# Patient Record
Sex: MALE | Race: WHITE
[De-identification: names, ages, dates, MRNs, and addresses within clinical notes are randomized per-mention and may not be internally consistent; named-entity substitution may affect disease eponyms.]

---

## 2016-09-09 VITALS
DIASTOLIC BLOOD PRESSURE: 97 MMHG | SYSTOLIC BLOOD PRESSURE: 146 MMHG | SYSTOLIC BLOOD PRESSURE: 146 MMHG | DIASTOLIC BLOOD PRESSURE: 97 MMHG | DIASTOLIC BLOOD PRESSURE: 97 MMHG | SYSTOLIC BLOOD PRESSURE: 146 MMHG | SYSTOLIC BLOOD PRESSURE: 146 MMHG | DIASTOLIC BLOOD PRESSURE: 97 MMHG | DIASTOLIC BLOOD PRESSURE: 97 MMHG | SYSTOLIC BLOOD PRESSURE: 146 MMHG | SYSTOLIC BLOOD PRESSURE: 146 MMHG | DIASTOLIC BLOOD PRESSURE: 97 MMHG | SYSTOLIC BLOOD PRESSURE: 146 MMHG | DIASTOLIC BLOOD PRESSURE: 97 MMHG

## 2017-03-01 ENCOUNTER — HOSPITAL ENCOUNTER (OUTPATIENT)
Dept: HOSPITAL 6 - LAB | Age: 57
End: 2017-03-01
Payer: COMMERCIAL

## 2017-03-01 DIAGNOSIS — R73.9: Primary | ICD-10-CM

## 2017-08-09 ENCOUNTER — HOSPITAL ENCOUNTER (EMERGENCY)
Dept: HOSPITAL 44 - ED | Age: 57
Discharge: HOME | End: 2017-08-09
Payer: COMMERCIAL

## 2017-08-09 VITALS — DIASTOLIC BLOOD PRESSURE: 95 MMHG | SYSTOLIC BLOOD PRESSURE: 142 MMHG
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
## 2017-08-09 DIAGNOSIS — S63.591A: Primary | ICD-10-CM

## 2017-08-09 PROCEDURE — 73110 X-RAY EXAM OF WRIST: CPT

## 2017-08-09 PROCEDURE — 99283 EMERGENCY DEPT VISIT LOW MDM: CPT

## 2017-08-09 NOTE — ED PHYSICIAN DOCUMENTATION
Upper Extremity Injury





- HISTORIAN


Historian: patient





- HPI


Stated Complaint: right wrist pain


Chief Complaint: Upper Extremity Problem





- ROS


CONST: no problems





- PAST HX


Past History: none





- SOCIAL HX


Smoking History: non-smoker





- FAMILY HX


Family History: none





- REVIEWED ASSESSMENTS


Nursing Assessment  Reviewed: Yes


Vitals Reviewed: Yes





<HUNTER SPICER - Last Filed: 08/09/17 06:57>





<Michael Neumann - Last Filed: 08/09/17 07:43>





- HPI


Additional Information: 





Right wrist hurt worse yesterday after work. Woke him at 0430. Took diclofenac w

/o relief. Lays brick with right hand, otherwise left handed. No injury (HUNTER SPICER)





- PAST HX


Allergies/Adverse Reactions: 


 Allergies











Allergy/AdvReac Type Severity Reaction Status Date / Time


 


No Known Allergies Allergy   Unverified 08/09/17 06:42














Home Medications: 


 Ambulatory Orders











 Medication  Instructions  Recorded


 


Diclofenac Sodium [Diclofenac 100 mg PO QDAY 08/09/17





Sodium ER]  


 


Levothyroxine Sodium [Synthroid] 75 mcg PO QDAY 08/09/17


 


Lisinopril 20 mg PO QDAY 08/09/17


 


Ranitidine HCl 150 mg PO QDAY 08/09/17














- VITAL SIGNS


Vital Signs: 





 Vital Signs











Temp Pulse Resp BP Pulse Ox


 


 100.0 F H  98 H  18   177/86   98 


 


 08/09/17 06:10  08/09/17 06:10  08/09/17 06:10  08/09/17 06:10  08/09/17 06:10














Progress





<HUNTER SPICER - Last Filed: 08/09/17 06:57>





<Michael Neumann - Last Filed: 08/09/17 07:43>





- Progress


Progress: 





0700, care to Dr. Neumann. Awaiting radiologist read.  (HUNTER SPICER)





ED Results Lab/Radiology





<HUNTER SPICER - Last Filed: 08/09/17 06:57>





<Michael Nuemann - Last Filed: 08/09/17 07:43>





- Radiology


Radiology Impressions: 





no fracture seen--mod djd (Michael Neumann)





- Orders


Orders: 





 ED Orders











 Category Date Time Status


 


 WRIST 3 VIEWS OR MORE [RAD] Stat Exams  08/09/17 Completed














Upper Extremity Injury Physic





- Physical Exam


General Appearance: no acute distress, alert


Hand: normal inspection, non-tender, no evidence of injury, normal ROM


Wrist: no evidence of injury, normal ROM, swelling (right)


Elbow/Forearm: normal inspection, no evidence of injury


Neuro/Vascular/Tendon: no vascular compromise (right radial pulse 2+), motor nml

, sensation nml


Skin: warm,dry


Head/ENT: nml inspection


Neck/Back: nml inspection


Resp/CVS: no resp. distress





<HUNTER SPICER - Last Filed: 08/09/17 06:57>





Discharge





<HUNTER SPICER - Last Filed: 08/09/17 06:57>


Decision to Admit: NO


Decision Time: 07:43





<Michael Neumann - Last Filed: 08/09/17 07:43>


Clincal Impression: 


 wrist sprain, over use syndrome-





Referrals: 


Primary Doctor,No [Primary Care Provider] - 2 Days


Home Medications: 


Ambulatory Orders





Diclofenac Sodium [Diclofenac Sodium ER] 100 mg PO QDAY 08/09/17 


Levothyroxine Sodium [Synthroid] 75 mcg PO QDAY 08/09/17 


Lisinopril 20 mg PO QDAY 08/09/17 


Ranitidine HCl 150 mg PO QDAY 08/09/17 








Comments: 





pt has wrist splint-rec use and no work till improved (Michael Neumann)


Condition: Good


Disposition: 01 HOME, SELF-CARE

## 2017-08-09 NOTE — DIAGNOSTIC IMAGING REPORT
HUNTER SPICER 

[facility]

[id number] [facility] [address] [address]

 

 

 

 

Report Submission Date: Aug 9, 2017 7:08:20 AM CDT

Patient       Study

Name:   WOODY CLEVELAND       Date:   Aug 9, 2017 6:34:37 AM CDT

MRN:   [id number]       Modality Type:   CR

Gender:   M       Description:   UPPER EXTREMITY

:   [birth year]       Institution:   [facility]

Physician:   HUNTER SPICER

         

 

 

Right wrist -three views 



CLINICAL HISTORY:   

Pain beginning yesterday. 



FINDINGS:   

Examination right wrist in palmar, lateral and oblique views demonstrates 
degenerative changes with narrowing of the radiocarpal and lateral intercarpal 
joints.  There is no evident fracture and no lytic or blastic lesion. 



IMPRESSION:   

Degenerative changes.   

No fracture.

 

Electronically signed on Aug 9, 2017 7:08:20 AM CDT by:

Darryl NOLAND

## 2017-10-28 ENCOUNTER — HOSPITAL ENCOUNTER (OUTPATIENT)
Dept: HOSPITAL 6 - LAB | Age: 57
End: 2017-10-28
Payer: COMMERCIAL

## 2017-10-28 DIAGNOSIS — E78.2: ICD-10-CM

## 2017-10-28 DIAGNOSIS — Z00.00: Primary | ICD-10-CM

## 2017-10-28 DIAGNOSIS — E03.9: ICD-10-CM

## 2017-10-28 DIAGNOSIS — Z12.5: ICD-10-CM

## 2017-10-28 DIAGNOSIS — I10: ICD-10-CM

## 2017-10-28 LAB
ALBUMIN SERPL-MCNC: 4.4 G/DL (ref 3.5–5)
ALT SERPL-CCNC: 141 U/L (ref 21–72)
AST SERPL-CCNC: 61 U/L (ref 17–59)
BILIRUB SERPL-MCNC: 0.6 MG/DL (ref 0.2–1.3)
BUN/CREAT SERPL: 22.5 (ref 6–26)
CALCIUM SERPL-MCNC: 10.1 MG/DL (ref 8.4–10.2)
CO2 SERPL-SCNC: 24 MMOL/L (ref 22–30)
ERYTHROCYTE [DISTWIDTH] IN BLOOD BY AUTOMATED COUNT: 13.5 % (ref 11.5–14.5)
GLUCOSE SERPL-MCNC: 103 MG/DL (ref 75–110)
HCT VFR BLD AUTO: 46.1 % (ref 42–52)
HGB BLD-MCNC: 15.6 G/DL (ref 13.5–18)
MCH RBC QN AUTO: 30 PG (ref 27–31)
MCHC RBC AUTO-ENTMCNC: 34 G/DL (ref 33–37)
MCV RBC AUTO: 90 FL (ref 78–100)
PLATELET # BLD AUTO: 242 K/MM3 (ref 130–400)
PMV BLD AUTO: 10.3 FL (ref 7.4–10.4)
POTASSIUM SERPL-SCNC: 4.5 MMOL/L (ref 3.6–5)
PROT SERPL-MCNC: 7.8 G/DL (ref 6.3–8.2)
RBC # BLD AUTO: 5.13 M/MM3 (ref 4.2–5.6)
SODIUM SERPL-SCNC: 140 MMOL/L (ref 137–145)
WBC # BLD AUTO: 6.2 K/MM3 (ref 4.8–10.8)

## 2018-01-12 ENCOUNTER — HOSPITAL ENCOUNTER (OUTPATIENT)
Dept: HOSPITAL 6 - RAD | Age: 58
End: 2018-01-12
Attending: FAMILY MEDICINE
Payer: COMMERCIAL

## 2018-01-12 DIAGNOSIS — K82.9: ICD-10-CM

## 2018-01-12 DIAGNOSIS — K75.4: ICD-10-CM

## 2018-01-12 DIAGNOSIS — K76.0: Primary | ICD-10-CM

## 2018-01-27 ENCOUNTER — HOSPITAL ENCOUNTER (OUTPATIENT)
Dept: HOSPITAL 6 - LAB | Age: 58
End: 2018-01-27
Attending: INTERNAL MEDICINE
Payer: COMMERCIAL

## 2018-01-27 DIAGNOSIS — K75.4: Primary | ICD-10-CM

## 2018-01-27 LAB
ALBUMIN SERPL-MCNC: 4.3 G/DL (ref 3.5–5)
ALT SERPL-CCNC: 94 U/L (ref 21–72)
AST SERPL-CCNC: 54 U/L (ref 17–59)
BILIRUB DIRECT SERPL-MCNC: 0.2 MG/DL (ref 0–0.4)
BILIRUB SERPL-MCNC: 0.7 MG/DL (ref 0.2–1.3)
PROT SERPL-MCNC: 7.5 G/DL (ref 6.3–8.2)

## 2018-01-28 ENCOUNTER — HOSPITAL ENCOUNTER (OUTPATIENT)
Dept: HOSPITAL 19 - ZLAB.WCH | Age: 58
End: 2018-01-28

## 2018-01-28 DIAGNOSIS — Z01.89: Primary | ICD-10-CM

## 2018-01-28 LAB
FERRITIN SERPL-MCNC: 326 NG/ML (ref 18–464)
IRON SERPL-MCNC: 98 UG/DL (ref 35–150)
TIBC SERPL-MCNC: 313 UG/DL (ref 261–462)
TSH SERPL DL<=0.005 MIU/L-ACNC: 1.66 UIU/ML (ref 0.47–4.68)

## 2018-03-15 ENCOUNTER — HOSPITAL ENCOUNTER (OUTPATIENT)
Dept: HOSPITAL 6 - LAB | Age: 58
End: 2018-03-15
Attending: NURSE PRACTITIONER
Payer: COMMERCIAL

## 2018-03-15 DIAGNOSIS — H93.8X2: ICD-10-CM

## 2018-03-15 DIAGNOSIS — R00.0: ICD-10-CM

## 2018-03-15 DIAGNOSIS — A46: ICD-10-CM

## 2018-03-15 DIAGNOSIS — H92.02: Primary | ICD-10-CM

## 2018-03-15 LAB
ERYTHROCYTE [DISTWIDTH] IN BLOOD BY AUTOMATED COUNT: 13.3 % (ref 11.5–14.5)
HCT VFR BLD AUTO: 41.9 % (ref 42–52)
HGB BLD-MCNC: 13.9 G/DL (ref 13.5–18)
LYMPHOCYTES NFR BLD MANUAL: 9 % (ref 20–51)
MCH RBC QN AUTO: 30 PG (ref 27–31)
MCHC RBC AUTO-ENTMCNC: 33 G/DL (ref 33–37)
MCV RBC AUTO: 91 FL (ref 78–100)
MONOCYTES NFR BLD: 9 % (ref 3–10)
NEUTS SEG NFR BLD MANUAL: 82 % (ref 42–75)
PLATELET # BLD AUTO: 175 K/MM3 (ref 130–400)
PMV BLD AUTO: 10.2 FL (ref 7.4–10.4)
RBC # BLD AUTO: 4.63 M/MM3 (ref 4.2–5.6)
WBC # BLD AUTO: 6.3 K/MM3 (ref 4.8–10.8)

## 2018-12-06 ENCOUNTER — HOSPITAL ENCOUNTER (OUTPATIENT)
Dept: HOSPITAL 6 - RAD | Age: 58
End: 2018-12-06
Attending: INTERNAL MEDICINE
Payer: COMMERCIAL

## 2018-12-06 DIAGNOSIS — M17.12: Primary | ICD-10-CM

## 2018-12-08 ENCOUNTER — HOSPITAL ENCOUNTER (OUTPATIENT)
Dept: HOSPITAL 6 - LAB | Age: 58
End: 2018-12-08
Attending: INTERNAL MEDICINE
Payer: COMMERCIAL

## 2018-12-08 ENCOUNTER — HOSPITAL ENCOUNTER (OUTPATIENT)
Dept: HOSPITAL 19 - ZLAB.WCH | Age: 58
End: 2018-12-08

## 2018-12-08 DIAGNOSIS — Z01.89: Primary | ICD-10-CM

## 2018-12-08 DIAGNOSIS — E78.2: ICD-10-CM

## 2018-12-08 DIAGNOSIS — Z00.00: ICD-10-CM

## 2018-12-08 DIAGNOSIS — Z12.5: Primary | ICD-10-CM

## 2018-12-08 DIAGNOSIS — K75.4: ICD-10-CM

## 2018-12-08 DIAGNOSIS — I10: ICD-10-CM

## 2018-12-08 DIAGNOSIS — E78.5: ICD-10-CM

## 2018-12-08 DIAGNOSIS — E03.9: ICD-10-CM

## 2018-12-08 DIAGNOSIS — Z12.11: ICD-10-CM

## 2018-12-08 LAB
ALBUMIN SERPL-MCNC: 4.4 G/DL (ref 3.5–5)
ALT SERPL-CCNC: 94 U/L (ref 21–72)
AST SERPL-CCNC: 55 U/L (ref 17–59)
BILIRUB SERPL-MCNC: 0.6 MG/DL (ref 0.2–1.3)
CALCIUM SERPL-MCNC: 9.8 MG/DL (ref 8.4–10.2)
CO2 SERPL-SCNC: 26 MMOL/L (ref 22–30)
ERYTHROCYTE [DISTWIDTH] IN BLOOD BY AUTOMATED COUNT: 13.4 % (ref 11.5–14.5)
FERRITIN SERPL-MCNC: 334 NG/ML (ref 18–464)
GLUCOSE SERPL-MCNC: 100 MG/DL (ref 75–110)
HCT VFR BLD AUTO: 45.1 % (ref 42–52)
HGB BLD-MCNC: 14.7 G/DL (ref 13.5–18)
IRON SERPL-MCNC: 144 UG/DL (ref 35–150)
MCH RBC QN AUTO: 30 PG (ref 27–31)
MCHC RBC AUTO-ENTMCNC: 33 G/DL (ref 33–37)
MCV RBC AUTO: 91 FL (ref 78–100)
PLATELET # BLD AUTO: 237 K/MM3 (ref 130–400)
PMV BLD AUTO: 10.5 FL (ref 7.4–10.4)
POTASSIUM SERPL-SCNC: 4.6 MMOL/L (ref 3.6–5)
PROT SERPL-MCNC: 7 G/DL (ref 6.3–8.2)
PSA SERPL-MCNC: 1.03 NG/ML (ref 0–4)
RBC # BLD AUTO: 4.98 M/MM3 (ref 4.2–5.6)
SODIUM SERPL-SCNC: 140 MMOL/L (ref 137–145)
TIBC SERPL-MCNC: 335 UG/DL (ref 261–462)
TSH SERPL DL<=0.005 MIU/L-ACNC: 2.66 UIU/ML (ref 0.47–4.68)
WBC # BLD AUTO: 4.9 K/MM3 (ref 4.8–10.8)

## 2018-12-08 PROCEDURE — G0103 PSA SCREENING: HCPCS

## 2018-12-10 ENCOUNTER — HOSPITAL ENCOUNTER (OUTPATIENT)
Dept: HOSPITAL 6 - LAB | Age: 58
End: 2018-12-10
Attending: INTERNAL MEDICINE
Payer: COMMERCIAL

## 2018-12-10 DIAGNOSIS — Z12.11: Primary | ICD-10-CM

## 2018-12-13 ENCOUNTER — HOSPITAL ENCOUNTER (EMERGENCY)
Dept: HOSPITAL 6 - ED | Age: 58
Discharge: HOME | End: 2018-12-13
Payer: COMMERCIAL

## 2018-12-13 VITALS
SYSTOLIC BLOOD PRESSURE: 129 MMHG | DIASTOLIC BLOOD PRESSURE: 91 MMHG | SYSTOLIC BLOOD PRESSURE: 129 MMHG | DIASTOLIC BLOOD PRESSURE: 91 MMHG

## 2018-12-13 VITALS — HEIGHT: 72.01 IN | BODY MASS INDEX: 34.34 KG/M2 | WEIGHT: 253.53 LBS

## 2018-12-13 DIAGNOSIS — I10: ICD-10-CM

## 2018-12-13 DIAGNOSIS — Z79.899: ICD-10-CM

## 2018-12-13 DIAGNOSIS — Z79.82: ICD-10-CM

## 2018-12-13 DIAGNOSIS — K46.9: ICD-10-CM

## 2018-12-13 DIAGNOSIS — K21.9: ICD-10-CM

## 2018-12-13 DIAGNOSIS — E07.9: ICD-10-CM

## 2018-12-13 DIAGNOSIS — R63.0: ICD-10-CM

## 2018-12-13 DIAGNOSIS — R10.84: Primary | ICD-10-CM

## 2018-12-13 LAB
ALBUMIN SERPL-MCNC: 4.2 G/DL (ref 3.5–5)
ALT SERPL-CCNC: 77 U/L (ref 21–72)
APPEARANCE UR: CLEAR
AST SERPL-CCNC: 55 U/L (ref 17–59)
BILIRUB SERPL-MCNC: 0.5 MG/DL (ref 0.2–1.3)
BILIRUB UR QL STRIP.AUTO: NEGATIVE
CALCIUM SERPL-MCNC: 9.4 MG/DL (ref 8.4–10.2)
CO2 SERPL-SCNC: 27 MMOL/L (ref 22–30)
COLOR UR AUTO: (no result)
ERYTHROCYTE [DISTWIDTH] IN BLOOD BY AUTOMATED COUNT: 13.2 % (ref 11.5–14.5)
GLUCOSE SERPL-MCNC: 98 MG/DL (ref 75–110)
GLUCOSE UR QL STRIP.AUTO: NEGATIVE MG/DL
HCT VFR BLD AUTO: 43 % (ref 42–52)
HGB BLD-MCNC: 14.6 G/DL (ref 13.5–18)
KETONES UR QL STRIP.AUTO: NEGATIVE
LEUKOCYTE ESTERASE UR QL STRIP: NEGATIVE
LIPASE SERPL-CCNC: 91 U/L (ref 23–300)
LYMPHOCYTES NFR BLD MANUAL: 21 % (ref 20–51)
MCH RBC QN AUTO: 30 PG (ref 27–31)
MCHC RBC AUTO-ENTMCNC: 34 G/DL (ref 33–37)
MCV RBC AUTO: 89 FL (ref 78–100)
MONOCYTES NFR BLD: 9 % (ref 3–10)
NEUTS SEG NFR BLD MANUAL: 70 % (ref 42–75)
NITRITE UR QL STRIP: NEGATIVE
PH UR STRIP.AUTO: 6.5 [PH] (ref 5–8)
PLATELET # BLD AUTO: 218 K/MM3 (ref 130–400)
PMV BLD AUTO: 10.1 FL (ref 7.4–10.4)
POTASSIUM SERPL-SCNC: 4.5 MMOL/L (ref 3.6–5)
PROT ?TM UR-MCNC: NEGATIVE MG/DL
PROT SERPL-MCNC: 6.9 G/DL (ref 6.3–8.2)
RBC # BLD AUTO: 4.83 M/MM3 (ref 4.2–5.6)
RBC UR QL AUTO: NEGATIVE
SODIUM SERPL-SCNC: 138 MMOL/L (ref 137–145)
SP GR UR STRIP.AUTO: 1.01 (ref 1–1.03)
UROBILINOGEN UR-MCNC: NORMAL MG/DL
WBC # BLD AUTO: 3.9 K/MM3 (ref 4.8–10.8)
WBC #/AREA URNS HPF: (no result) /HPF (ref 0–3)

## 2019-07-08 ENCOUNTER — HOSPITAL ENCOUNTER (OUTPATIENT)
Dept: HOSPITAL 6 - RAD | Age: 59
End: 2019-07-08
Attending: NURSE PRACTITIONER
Payer: COMMERCIAL

## 2019-07-08 DIAGNOSIS — M19.041: Primary | ICD-10-CM

## 2019-08-04 ENCOUNTER — HOSPITAL ENCOUNTER (EMERGENCY)
Dept: HOSPITAL 6 - ED | Age: 59
Discharge: HOME | End: 2019-08-04
Payer: COMMERCIAL

## 2019-08-04 VITALS
SYSTOLIC BLOOD PRESSURE: 136 MMHG | DIASTOLIC BLOOD PRESSURE: 95 MMHG | DIASTOLIC BLOOD PRESSURE: 95 MMHG | DIASTOLIC BLOOD PRESSURE: 95 MMHG | DIASTOLIC BLOOD PRESSURE: 95 MMHG | SYSTOLIC BLOOD PRESSURE: 136 MMHG | SYSTOLIC BLOOD PRESSURE: 136 MMHG | SYSTOLIC BLOOD PRESSURE: 136 MMHG

## 2019-08-04 DIAGNOSIS — K21.9: ICD-10-CM

## 2019-08-04 DIAGNOSIS — I10: ICD-10-CM

## 2019-08-04 DIAGNOSIS — E03.9: ICD-10-CM

## 2019-08-04 DIAGNOSIS — M17.11: Primary | ICD-10-CM

## 2019-08-04 DIAGNOSIS — Z79.82: ICD-10-CM

## 2019-11-29 ENCOUNTER — HOSPITAL ENCOUNTER (OUTPATIENT)
Dept: HOSPITAL 6 - LAB | Age: 59
End: 2019-11-29
Attending: INTERNAL MEDICINE
Payer: COMMERCIAL

## 2019-11-29 DIAGNOSIS — E78.2: ICD-10-CM

## 2019-11-29 DIAGNOSIS — E03.9: ICD-10-CM

## 2019-11-29 DIAGNOSIS — Z00.00: Primary | ICD-10-CM

## 2019-11-29 DIAGNOSIS — Z12.11: ICD-10-CM

## 2019-11-29 DIAGNOSIS — Z12.5: ICD-10-CM

## 2019-11-29 DIAGNOSIS — I10: ICD-10-CM

## 2019-11-29 LAB
ALBUMIN SERPL-MCNC: 4.1 G/DL (ref 3.5–5)
ALT SERPL-CCNC: 67 U/L (ref 0–55)
AST SERPL-CCNC: 43 U/L (ref 5–34)
BILIRUB SERPL-MCNC: 0.5 MG/DL (ref 0.2–1.2)
CALCIUM SERPL-MCNC: 9.5 MG/DL (ref 8.3–10.5)
CO2 SERPL-SCNC: 26 MMOL/L (ref 22–29)
ERYTHROCYTE [DISTWIDTH] IN BLOOD BY AUTOMATED COUNT: 13.7 % (ref 11.5–14.5)
GLUCOSE SERPL-MCNC: 104 MG/DL (ref 75–110)
HCT VFR BLD AUTO: 45.1 % (ref 42–52)
HGB BLD-MCNC: 14.6 G/DL (ref 13.5–18)
MCH RBC QN AUTO: 30 PG (ref 27–31)
MCHC RBC AUTO-ENTMCNC: 32 G/DL (ref 33–37)
MCV RBC AUTO: 93 FL (ref 78–100)
PLATELET # BLD AUTO: 186 K/MM3 (ref 130–400)
PMV BLD AUTO: 10.3 FL (ref 7.4–10.4)
POTASSIUM SERPL-SCNC: 4.6 MMOL/L (ref 3.5–5.1)
PROT SERPL-MCNC: 6.9 G/DL (ref 6.4–8.3)
RBC # BLD AUTO: 4.84 M/MM3 (ref 4.2–5.6)
SODIUM SERPL-SCNC: 140 MMOL/L (ref 136–145)
WBC # BLD AUTO: 3.7 K/MM3 (ref 4.8–10.8)

## 2019-12-01 ENCOUNTER — HOSPITAL ENCOUNTER (OUTPATIENT)
Dept: HOSPITAL 6 - LAB | Age: 59
End: 2019-12-01
Attending: INTERNAL MEDICINE
Payer: COMMERCIAL

## 2019-12-01 DIAGNOSIS — E03.9: ICD-10-CM

## 2019-12-01 DIAGNOSIS — Z00.00: Primary | ICD-10-CM

## 2019-12-01 DIAGNOSIS — E78.2: ICD-10-CM

## 2019-12-01 DIAGNOSIS — Z12.5: ICD-10-CM

## 2019-12-01 DIAGNOSIS — I10: ICD-10-CM

## 2020-08-22 ENCOUNTER — HOSPITAL ENCOUNTER (EMERGENCY)
Dept: HOSPITAL 6 - ED | Age: 60
Discharge: HOME | End: 2020-08-22
Payer: COMMERCIAL

## 2020-08-22 VITALS
DIASTOLIC BLOOD PRESSURE: 88 MMHG | SYSTOLIC BLOOD PRESSURE: 138 MMHG | DIASTOLIC BLOOD PRESSURE: 88 MMHG | DIASTOLIC BLOOD PRESSURE: 88 MMHG | DIASTOLIC BLOOD PRESSURE: 88 MMHG | SYSTOLIC BLOOD PRESSURE: 138 MMHG | SYSTOLIC BLOOD PRESSURE: 138 MMHG | SYSTOLIC BLOOD PRESSURE: 138 MMHG | SYSTOLIC BLOOD PRESSURE: 138 MMHG | DIASTOLIC BLOOD PRESSURE: 88 MMHG

## 2020-08-22 VITALS — BODY MASS INDEX: 33.26 KG/M2 | WEIGHT: 245.59 LBS | HEIGHT: 72.01 IN

## 2020-08-22 DIAGNOSIS — E03.9: ICD-10-CM

## 2020-08-22 DIAGNOSIS — I10: ICD-10-CM

## 2020-08-22 DIAGNOSIS — F41.9: ICD-10-CM

## 2020-08-22 DIAGNOSIS — K21.9: Primary | ICD-10-CM

## 2020-08-22 DIAGNOSIS — Z79.82: ICD-10-CM

## 2020-08-22 LAB
ALBUMIN SERPL-MCNC: 4.4 G/DL (ref 3.5–5)
ALT SERPL-CCNC: 30 U/L (ref 0–55)
AST SERPL-CCNC: 21 U/L (ref 5–34)
BASOPHILS # BLD: 0 10*3/UL (ref 0.02–0.1)
BILIRUB SERPL-MCNC: 0.7 MG/DL (ref 0.2–1.2)
CALCIUM SERPL-MCNC: 9.7 MG/DL (ref 8.3–10.5)
CO2 SERPL-SCNC: 22 MMOL/L (ref 22–29)
EOSINOPHIL # BLD: 0 10*3/UL (ref 0.04–0.4)
EOSINOPHIL NFR BLD: 0.6 % (ref 0–4)
ERYTHROCYTE [DISTWIDTH] IN BLOOD BY AUTOMATED COUNT: 13.5 % (ref 11.5–14.5)
GLUCOSE SERPL-MCNC: 106 MG/DL (ref 75–110)
HCT VFR BLD AUTO: 44.2 % (ref 42–52)
HGB BLD-MCNC: 14.7 G/DL (ref 13.5–18)
LYMPHOCYTES # BLD: 0.8 10*3/UL (ref 1.5–4)
MCH RBC QN AUTO: 30 PG (ref 27–31)
MCHC RBC AUTO-ENTMCNC: 33 G/DL (ref 33–37)
MCV RBC AUTO: 91 FL (ref 78–100)
MONOCYTES # BLD: 0.6 10*3/UL (ref 0.2–0.8)
NEUTROPHILS # BLD: 4.8 10*3/UL (ref 1.4–6.5)
PLATELET # BLD AUTO: 185 K/MM3 (ref 130–400)
PMV BLD AUTO: 9.6 FL (ref 7.4–10.4)
POTASSIUM SERPL-SCNC: 3.8 MMOL/L (ref 3.5–5.1)
PROT SERPL-MCNC: 7 G/DL (ref 6.4–8.3)
RBC # BLD AUTO: 4.85 M/MM3 (ref 4.2–5.6)
SODIUM SERPL-SCNC: 141 MMOL/L (ref 136–145)
WBC # BLD AUTO: 6.2 K/MM3 (ref 4.8–10.8)

## 2020-08-26 ENCOUNTER — HOSPITAL ENCOUNTER (OUTPATIENT)
Dept: HOSPITAL 6 - PT | Age: 60
Discharge: STILL ADMITTED OR ADMINISTRATIVE | End: 2020-08-26
Payer: COMMERCIAL

## 2020-08-26 DIAGNOSIS — M75.122: Primary | ICD-10-CM

## 2020-09-24 ENCOUNTER — HOSPITAL ENCOUNTER (OUTPATIENT)
Dept: HOSPITAL 6 - PT | Age: 60
Discharge: STILL ADMITTED OR ADMINISTRATIVE | End: 2020-09-24
Payer: COMMERCIAL

## 2020-09-24 DIAGNOSIS — M75.122: Primary | ICD-10-CM

## 2020-09-30 ENCOUNTER — HOSPITAL ENCOUNTER (OUTPATIENT)
Dept: HOSPITAL 19 - SDCO | Age: 60
Discharge: HOME | End: 2020-09-30
Attending: SURGERY
Payer: COMMERCIAL

## 2020-09-30 VITALS — HEART RATE: 78 BPM | SYSTOLIC BLOOD PRESSURE: 120 MMHG | DIASTOLIC BLOOD PRESSURE: 73 MMHG

## 2020-09-30 VITALS — WEIGHT: 239.86 LBS | BODY MASS INDEX: 32.49 KG/M2 | HEIGHT: 72 IN

## 2020-09-30 VITALS — SYSTOLIC BLOOD PRESSURE: 140 MMHG | DIASTOLIC BLOOD PRESSURE: 89 MMHG | HEART RATE: 85 BPM | TEMPERATURE: 98.6 F

## 2020-09-30 VITALS — SYSTOLIC BLOOD PRESSURE: 119 MMHG | HEART RATE: 77 BPM | DIASTOLIC BLOOD PRESSURE: 78 MMHG

## 2020-09-30 VITALS — HEART RATE: 82 BPM | TEMPERATURE: 97.7 F | DIASTOLIC BLOOD PRESSURE: 71 MMHG | SYSTOLIC BLOOD PRESSURE: 135 MMHG

## 2020-09-30 VITALS — SYSTOLIC BLOOD PRESSURE: 134 MMHG | DIASTOLIC BLOOD PRESSURE: 71 MMHG | HEART RATE: 76 BPM

## 2020-09-30 VITALS — SYSTOLIC BLOOD PRESSURE: 128 MMHG | HEART RATE: 73 BPM | DIASTOLIC BLOOD PRESSURE: 76 MMHG

## 2020-09-30 VITALS — SYSTOLIC BLOOD PRESSURE: 120 MMHG | HEART RATE: 78 BPM | DIASTOLIC BLOOD PRESSURE: 69 MMHG

## 2020-09-30 DIAGNOSIS — K21.9: ICD-10-CM

## 2020-09-30 DIAGNOSIS — I10: ICD-10-CM

## 2020-09-30 DIAGNOSIS — K42.9: Primary | ICD-10-CM

## 2020-09-30 DIAGNOSIS — Z83.3: ICD-10-CM

## 2020-09-30 DIAGNOSIS — E03.9: ICD-10-CM

## 2020-09-30 DIAGNOSIS — K75.4: ICD-10-CM

## 2020-09-30 DIAGNOSIS — Z20.828: ICD-10-CM

## 2020-09-30 DIAGNOSIS — Z79.899: ICD-10-CM

## 2020-09-30 DIAGNOSIS — Z80.8: ICD-10-CM

## 2020-09-30 DIAGNOSIS — Z80.2: ICD-10-CM

## 2020-09-30 DIAGNOSIS — Z80.3: ICD-10-CM

## 2020-09-30 PROCEDURE — C1781 MESH (IMPLANTABLE): HCPCS

## 2020-09-30 NOTE — NUR
The patient arrived back to [facility] from the recovery room at this time. The
patient appears drowsy but arouses easily to his name. The patient has 3 lap
sites that appears without redness or edema and are covered with surgical glue
at this time. Post operative vital signs were started at this time. The
patient has oxygen in place at 2L per nasal cannula. Wife is at his bedside at
this time. Call light is within reach. Will continue to monitor the patient.

## 2020-09-30 NOTE — NUR
The patient was weaned down to 1L of oxygen per nasal cannula. The patient has
tolerated the water well agrees to try some toast at this time. The patient's
wife remains at his bedside. Vital signs appear stable. Will continue to
monitor the patient.

## 2020-09-30 NOTE — NUR
The patient ambulated to the bathroom with the stand by assistance of one
nurse and his wife and appeared to tolerate the activity well.

## 2020-09-30 NOTE — NUR
The patient's wife is going to step out to grab some lunch and will return to
the patient's bedside when she is finished. The patient denies wanting
anything further to eat or drink at this time. Will continue to monitor the
patient.

## 2020-09-30 NOTE — NUR
Discharge instructions were reviewed with the patient and his wife at this
time. They both verbalized understanding and have no questions for the nurse
at this time. The patient's IV his right hand was removed and a pressure
dressing was applied to the site. The patient is dressed and ready to be
escorted out.

## 2020-09-30 NOTE — NUR
The patient appeared to tolerate the toast well and was given a PRN dose of
Norco for reports of increased pain. The patient reports his pain a "9" out of
10 but appears to be resting comfortably on the cart in no visible distress.
The patient's wife remains at his bedside. Will continue to monitor the
patient.

## 2020-09-30 NOTE — NUR
The patient appears to be resting comfortably on the cart at this time. The
patient's wife remains at his bedside. Will continue to monitor the patient.

## 2020-09-30 NOTE — NUR
The patient was escorted out via wheelchair to a private vehicle by FREDI Goldsmith.
The patient's belongings and discharge paperwork were sent with her. The
patient's wife is present to drive him home.

## 2020-12-04 ENCOUNTER — HOSPITAL ENCOUNTER (OUTPATIENT)
Dept: HOSPITAL 6 - LAB | Age: 60
End: 2020-12-04
Attending: INTERNAL MEDICINE
Payer: COMMERCIAL

## 2020-12-04 DIAGNOSIS — E03.9: ICD-10-CM

## 2020-12-04 DIAGNOSIS — Z00.00: Primary | ICD-10-CM

## 2020-12-04 DIAGNOSIS — Z12.5: ICD-10-CM

## 2020-12-04 DIAGNOSIS — I10: ICD-10-CM

## 2020-12-04 DIAGNOSIS — E78.2: ICD-10-CM

## 2020-12-04 LAB
ALBUMIN SERPL-MCNC: 4.3 G/DL (ref 3.5–5)
ALT SERPL-CCNC: 30 U/L (ref 0–55)
AST SERPL-CCNC: 21 U/L (ref 5–34)
BILIRUB SERPL-MCNC: 0.7 MG/DL (ref 0.2–1.2)
CALCIUM SERPL-MCNC: 9.7 MG/DL (ref 8.3–10.5)
CO2 SERPL-SCNC: 28 MMOL/L (ref 22–29)
ERYTHROCYTE [DISTWIDTH] IN BLOOD BY AUTOMATED COUNT: 13.6 % (ref 11.5–14.5)
GLUCOSE SERPL-MCNC: 99 MG/DL (ref 75–110)
HCT VFR BLD AUTO: 43.3 % (ref 42–52)
HGB BLD-MCNC: 13.9 G/DL (ref 13.5–18)
MCH RBC QN AUTO: 30 PG (ref 27–31)
MCHC RBC AUTO-ENTMCNC: 32 G/DL (ref 33–37)
MCV RBC AUTO: 92 FL (ref 78–100)
PLATELET # BLD AUTO: 237 K/MM3 (ref 130–400)
PMV BLD AUTO: 9.9 FL (ref 7.4–10.4)
POTASSIUM SERPL-SCNC: 4.4 MMOL/L (ref 3.5–5.1)
PROT SERPL-MCNC: 6.9 G/DL (ref 6.4–8.3)
RBC # BLD AUTO: 4.71 M/MM3 (ref 4.2–5.6)
SODIUM SERPL-SCNC: 141 MMOL/L (ref 136–145)
WBC # BLD AUTO: 4.3 K/MM3 (ref 4.8–10.8)

## 2020-12-07 ENCOUNTER — HOSPITAL ENCOUNTER (OUTPATIENT)
Dept: HOSPITAL 6 - LAB | Age: 60
End: 2020-12-07
Attending: INTERNAL MEDICINE
Payer: COMMERCIAL

## 2020-12-07 DIAGNOSIS — I10: ICD-10-CM

## 2020-12-07 DIAGNOSIS — E78.2: ICD-10-CM

## 2020-12-07 DIAGNOSIS — Z00.00: Primary | ICD-10-CM

## 2020-12-07 DIAGNOSIS — E03.9: ICD-10-CM

## 2020-12-07 DIAGNOSIS — Z12.5: ICD-10-CM

## 2020-12-17 ENCOUNTER — HOSPITAL ENCOUNTER (OUTPATIENT)
Dept: HOSPITAL 6 - LAB | Age: 60
End: 2020-12-17
Attending: INTERNAL MEDICINE
Payer: COMMERCIAL

## 2020-12-17 DIAGNOSIS — K90.9: Primary | ICD-10-CM

## 2020-12-17 DIAGNOSIS — N52.9: ICD-10-CM

## 2020-12-17 LAB — TESTOST SERPL-MCNC: 519 NG/DL (ref 221–716)

## 2021-06-18 ENCOUNTER — HOSPITAL ENCOUNTER (OUTPATIENT)
Dept: HOSPITAL 6 - LAB | Age: 61
End: 2021-06-18
Attending: INTERNAL MEDICINE
Payer: COMMERCIAL

## 2021-06-18 DIAGNOSIS — F41.8: ICD-10-CM

## 2021-06-18 DIAGNOSIS — K90.9: Primary | ICD-10-CM

## 2021-06-18 LAB
ALT SERPL-CCNC: 21 U/L (ref 0–55)
AST SERPL-CCNC: 18 U/L (ref 5–34)
BASOPHILS # BLD: 0.02 10*3/UL (ref 0.02–0.1)
BILIRUB SERPL-MCNC: 0.3 MG/DL (ref 0.2–1.2)
CALCIUM SERPL-MCNC: 9.1 MG/DL (ref 8.3–10.5)
CO2 SERPL-SCNC: 25 MMOL/L (ref 22–29)
EOSINOPHIL # BLD: 0.06 10*3/UL (ref 0.04–0.4)
EOSINOPHIL NFR BLD: 1.4 % (ref 0–4)
ERYTHROCYTE [DISTWIDTH] IN BLOOD BY AUTOMATED COUNT: 13.6 % (ref 11.5–14.5)
GLUCOSE SERPL-MCNC: 107 MG/DL (ref 75–110)
HCT VFR BLD AUTO: 40 % (ref 42–52)
HGB BLD-MCNC: 13.1 G/DL (ref 13.5–18)
LYMPHOCYTES # BLD: 0.89 10*3/UL (ref 1.5–4)
MCH RBC QN AUTO: 29 PG (ref 27–31)
MCHC RBC AUTO-ENTMCNC: 33 G/DL (ref 33–37)
MCV RBC AUTO: 90 FL (ref 78–100)
MONOCYTES # BLD: 0.56 10*3/UL (ref 0.2–0.8)
NEUTROPHILS # BLD: 2.85 10*3/UL (ref 1.4–6.5)
PLATELET # BLD AUTO: 217 K/MM3 (ref 130–400)
PMV BLD AUTO: 9.4 FL (ref 7.4–10.4)
POTASSIUM SERPL-SCNC: 4.3 MMOL/L (ref 3.5–5.1)
PROT SERPL-MCNC: 6.5 G/DL (ref 6.4–8.3)
RBC # BLD AUTO: 4.45 M/MM3 (ref 4.2–5.6)
SODIUM SERPL-SCNC: 142 MMOL/L (ref 136–145)
WBC # BLD AUTO: 4.4 K/MM3 (ref 4.8–10.8)

## 2021-06-21 LAB — ALBUMIN SERPL-MCNC: 3.9 G/DL (ref 3.5–5)

## 2021-08-16 ENCOUNTER — HOSPITAL ENCOUNTER (OUTPATIENT)
Dept: HOSPITAL 6 - LAB | Age: 61
End: 2021-08-16
Attending: INTERNAL MEDICINE
Payer: COMMERCIAL

## 2021-08-16 DIAGNOSIS — Z01.818: Primary | ICD-10-CM

## 2021-08-16 LAB
ALBUMIN SERPL-MCNC: 4.2 G/DL (ref 3.5–5)
ALT SERPL-CCNC: 23 U/L (ref 0–55)
APPEARANCE UR: CLEAR
AST SERPL-CCNC: 23 U/L (ref 5–34)
BASOPHILS # BLD: 0.03 10*3/UL (ref 0.02–0.1)
BILIRUB SERPL-MCNC: 0.4 MG/DL (ref 0.2–1.2)
BILIRUB UR QL STRIP.AUTO: NEGATIVE
CALCIUM SERPL-MCNC: 9.6 MG/DL (ref 8.3–10.5)
CO2 SERPL-SCNC: 21 MMOL/L (ref 22–29)
COLOR UR AUTO: YELLOW
EOSINOPHIL # BLD: 0.08 10*3/UL (ref 0.04–0.4)
EOSINOPHIL NFR BLD: 1.5 % (ref 0–4)
ERYTHROCYTE [DISTWIDTH] IN BLOOD BY AUTOMATED COUNT: 12.9 % (ref 11.5–14.5)
GLUCOSE SERPL-MCNC: 87 MG/DL (ref 75–110)
GLUCOSE UR QL STRIP.AUTO: NEGATIVE MG/DL
HCT VFR BLD AUTO: 45.2 % (ref 42–52)
HGB BLD-MCNC: 15 G/DL (ref 13.5–18)
KETONES UR QL STRIP.AUTO: NEGATIVE
LEUKOCYTE ESTERASE UR QL STRIP: NEGATIVE
LYMPHOCYTES # BLD: 0.77 10*3/UL (ref 1.5–4)
MAGNESIUM SERPL-MCNC: 1.91 MG/DL (ref 1.6–2.6)
MCH RBC QN AUTO: 30 PG (ref 27–31)
MCHC RBC AUTO-ENTMCNC: 33 G/DL (ref 33–37)
MCV RBC AUTO: 90 FL (ref 78–100)
MONOCYTES # BLD: 0.57 10*3/UL (ref 0.2–0.8)
MUCOUS THREADS URNS QL MICRO: PRESENT
NEUTROPHILS # BLD: 3.83 10*3/UL (ref 1.4–6.5)
NITRITE UR QL STRIP: NEGATIVE
PH UR STRIP.AUTO: 6 [PH] (ref 5–8)
PLATELET # BLD AUTO: 225 K/MM3 (ref 130–400)
PMV BLD AUTO: 9.3 FL (ref 7.4–10.4)
POTASSIUM SERPL-SCNC: 4 MMOL/L (ref 3.5–5.1)
PROT ?TM UR-MCNC: NEGATIVE MG/DL
PROT SERPL-MCNC: 7.4 G/DL (ref 6.4–8.3)
PROTHROMBIN TIME: 10 SECONDS (ref 9–12)
RBC # BLD AUTO: 5.02 M/MM3 (ref 4.2–5.6)
RBC UR QL AUTO: NEGATIVE
SODIUM SERPL-SCNC: 141 MMOL/L (ref 136–145)
SP GR UR STRIP.AUTO: 1.02 (ref 1–1.03)
UROBILINOGEN UR-MCNC: NORMAL MG/DL
WBC # BLD AUTO: 5.3 K/MM3 (ref 4.8–10.8)
WBC #/AREA URNS HPF: 0 /HPF (ref 0–3)

## 2021-08-23 ENCOUNTER — HOSPITAL ENCOUNTER (OUTPATIENT)
Dept: HOSPITAL 6 - PT | Age: 61
End: 2021-08-23
Payer: COMMERCIAL

## 2021-08-23 DIAGNOSIS — M17.12: ICD-10-CM

## 2021-08-23 DIAGNOSIS — M17.11: Primary | ICD-10-CM

## 2021-09-03 ENCOUNTER — HOSPITAL ENCOUNTER (OUTPATIENT)
Dept: HOSPITAL 6 - PT | Age: 61
LOS: 90 days | Discharge: HOME | End: 2021-12-02
Payer: COMMERCIAL

## 2021-09-03 DIAGNOSIS — M17.0: Primary | ICD-10-CM

## 2021-11-22 ENCOUNTER — HOSPITAL ENCOUNTER (OUTPATIENT)
Dept: HOSPITAL 6 - LAB | Age: 61
End: 2021-11-22
Attending: INTERNAL MEDICINE
Payer: COMMERCIAL

## 2021-11-22 DIAGNOSIS — Z00.01: ICD-10-CM

## 2021-11-22 DIAGNOSIS — Z12.5: Primary | ICD-10-CM

## 2021-11-22 LAB
ALBUMIN SERPL-MCNC: 4.2 G/DL (ref 3.5–5)
ALT SERPL-CCNC: 29 U/L (ref 0–55)
AST SERPL-CCNC: 27 U/L (ref 5–34)
BASOPHILS # BLD: 0.02 K/MM3 (ref 0.02–0.1)
BILIRUB SERPL-MCNC: 0.7 MG/DL (ref 0.2–1.2)
CALCIUM SERPL-MCNC: 9.8 MG/DL (ref 8.3–10.5)
CO2 SERPL-SCNC: 25 MMOL/L (ref 22–29)
EOSINOPHIL # BLD: 0.12 K/MM3 (ref 0.04–0.4)
EOSINOPHIL NFR BLD: 3.3 % (ref 0–4)
ERYTHROCYTE [DISTWIDTH] IN BLOOD BY AUTOMATED COUNT: 13.2 % (ref 11.5–14.5)
ERYTHROCYTE [SEDIMENTATION RATE] IN BLOOD: 6 MM/HR (ref 0–20)
GLUCOSE SERPL-MCNC: 99 MG/DL (ref 75–110)
HCT VFR BLD AUTO: 44.2 % (ref 42–52)
HGB BLD-MCNC: 14.2 G/DL (ref 13.5–18)
LYMPHOCYTES # BLD: 0.81 K/MM3 (ref 1.5–4)
MAGNESIUM SERPL-MCNC: 1.94 MG/DL (ref 1.6–2.6)
MCH RBC QN AUTO: 28 PG (ref 27–31)
MCHC RBC AUTO-ENTMCNC: 32 G/DL (ref 33–37)
MCV RBC AUTO: 88 FL (ref 78–100)
MONOCYTES # BLD: 0.52 K/MM3 (ref 0.2–0.8)
NEUTROPHILS # BLD: 2.13 K/MM3 (ref 1.4–6.5)
PLATELET # BLD AUTO: 202 K/MM3 (ref 130–400)
PMV BLD AUTO: 9.6 FL (ref 7.4–10.4)
POTASSIUM SERPL-SCNC: 4.3 MMOL/L (ref 3.5–5.1)
PROT SERPL-MCNC: 6.9 G/DL (ref 6.4–8.3)
RBC # BLD AUTO: 5.02 M/MM3 (ref 4.2–5.6)
SODIUM SERPL-SCNC: 141 MMOL/L (ref 136–145)
WBC # BLD AUTO: 3.6 K/MM3 (ref 4.8–10.8)

## 2021-11-24 ENCOUNTER — HOSPITAL ENCOUNTER (OUTPATIENT)
Dept: HOSPITAL 6 - LAB | Age: 61
End: 2021-11-24
Attending: INTERNAL MEDICINE
Payer: COMMERCIAL

## 2021-11-24 DIAGNOSIS — Z12.11: Primary | ICD-10-CM

## 2021-12-07 ENCOUNTER — HOSPITAL ENCOUNTER (OUTPATIENT)
Dept: HOSPITAL 6 - LAB | Age: 61
End: 2021-12-07
Attending: INTERNAL MEDICINE
Payer: COMMERCIAL

## 2021-12-07 DIAGNOSIS — K92.1: Primary | ICD-10-CM

## 2021-12-16 ENCOUNTER — HOSPITAL ENCOUNTER (OUTPATIENT)
Dept: HOSPITAL 6 - MSO | Age: 61
Discharge: HOME | End: 2021-12-16
Payer: COMMERCIAL

## 2021-12-16 DIAGNOSIS — K57.30: ICD-10-CM

## 2021-12-16 DIAGNOSIS — G89.29: ICD-10-CM

## 2021-12-16 DIAGNOSIS — K21.9: Primary | ICD-10-CM

## 2021-12-16 DIAGNOSIS — R19.5: ICD-10-CM

## 2021-12-16 DIAGNOSIS — E07.9: ICD-10-CM

## 2021-12-16 DIAGNOSIS — Z79.899: ICD-10-CM

## 2021-12-16 DIAGNOSIS — K22.89: ICD-10-CM

## 2021-12-16 DIAGNOSIS — F32.A: ICD-10-CM

## 2021-12-16 DIAGNOSIS — I10: ICD-10-CM

## 2022-03-17 ENCOUNTER — HOSPITAL ENCOUNTER (OUTPATIENT)
Dept: HOSPITAL 6 - PT | Age: 62
LOS: 14 days | Discharge: HOME | End: 2022-03-31
Attending: ORTHOPAEDIC SURGERY
Payer: COMMERCIAL

## 2022-03-17 DIAGNOSIS — M75.122: Primary | ICD-10-CM

## 2022-04-04 ENCOUNTER — HOSPITAL ENCOUNTER (OUTPATIENT)
Dept: HOSPITAL 6 - PT | Age: 62
LOS: 26 days | Discharge: HOME | End: 2022-04-30
Attending: ORTHOPAEDIC SURGERY
Payer: COMMERCIAL

## 2022-04-04 DIAGNOSIS — M75.122: Primary | ICD-10-CM

## 2022-05-03 ENCOUNTER — HOSPITAL ENCOUNTER (OUTPATIENT)
Dept: HOSPITAL 6 - PT | Age: 62
LOS: 28 days | Discharge: HOME | End: 2022-05-31
Attending: ORTHOPAEDIC SURGERY
Payer: COMMERCIAL

## 2022-05-03 DIAGNOSIS — M75.122: Primary | ICD-10-CM

## 2022-06-02 ENCOUNTER — HOSPITAL ENCOUNTER (OUTPATIENT)
Dept: HOSPITAL 6 - PT | Age: 62
LOS: 28 days | Discharge: HOME | End: 2022-06-30
Attending: ORTHOPAEDIC SURGERY
Payer: COMMERCIAL

## 2022-06-02 DIAGNOSIS — M75.122: Primary | ICD-10-CM

## 2022-06-03 ENCOUNTER — HOSPITAL ENCOUNTER (OUTPATIENT)
Dept: HOSPITAL 6 - LAB | Age: 62
End: 2022-06-03
Attending: INTERNAL MEDICINE
Payer: COMMERCIAL

## 2022-06-03 DIAGNOSIS — I10: ICD-10-CM

## 2022-06-03 DIAGNOSIS — E78.2: ICD-10-CM

## 2022-06-03 DIAGNOSIS — E03.4: Primary | ICD-10-CM

## 2022-06-03 DIAGNOSIS — K76.0: ICD-10-CM

## 2022-06-03 DIAGNOSIS — M17.9: ICD-10-CM

## 2022-06-03 LAB
ALBUMIN SERPL-MCNC: 4.1 G/DL (ref 3.5–5)
ALT SERPL-CCNC: 44 U/L (ref 0–55)
AST SERPL-CCNC: 29 U/L (ref 5–34)
BASOPHILS # BLD: 0.01 K/MM3 (ref 0.02–0.1)
BILIRUB SERPL-MCNC: 0.4 MG/DL (ref 0.2–1.2)
CALCIUM SERPL-MCNC: 9.5 MG/DL (ref 8.3–10.5)
CO2 SERPL-SCNC: 25 MMOL/L (ref 22–29)
EOSINOPHIL # BLD: 0.09 K/MM3 (ref 0.04–0.4)
EOSINOPHIL NFR BLD: 2.3 % (ref 0–4)
ERYTHROCYTE [DISTWIDTH] IN BLOOD BY AUTOMATED COUNT: 13.2 % (ref 11.5–14.5)
GLUCOSE SERPL-MCNC: 89 MG/DL (ref 75–110)
HCT VFR BLD AUTO: 43.7 % (ref 42–52)
HGB BLD-MCNC: 14.5 G/DL (ref 13.5–18)
LYMPHOCYTES # BLD: 0.85 K/MM3 (ref 1.5–4)
MAGNESIUM SERPL-MCNC: 2.03 MG/DL (ref 1.6–2.6)
MCH RBC QN AUTO: 29 PG (ref 27–31)
MCHC RBC AUTO-ENTMCNC: 33 G/DL (ref 33–37)
MCV RBC AUTO: 87 FL (ref 78–100)
MONOCYTES # BLD: 0.57 K/MM3 (ref 0.2–0.8)
NEUTROPHILS # BLD: 2.36 K/MM3 (ref 1.4–6.5)
PLATELET # BLD AUTO: 220 K/MM3 (ref 130–400)
PMV BLD AUTO: 9.7 FL (ref 7.4–10.4)
POTASSIUM SERPL-SCNC: 4.1 MMOL/L (ref 3.5–5.1)
PROT SERPL-MCNC: 6.8 G/DL (ref 6.4–8.3)
RBC # BLD AUTO: 5.03 M/MM3 (ref 4.2–5.6)
SODIUM SERPL-SCNC: 140 MMOL/L (ref 136–145)
WBC # BLD AUTO: 3.9 K/MM3 (ref 4.8–10.8)

## 2022-07-05 ENCOUNTER — HOSPITAL ENCOUNTER (OUTPATIENT)
Dept: HOSPITAL 6 - PT | Age: 62
LOS: 2 days | Discharge: HOME | End: 2022-07-07
Attending: ORTHOPAEDIC SURGERY
Payer: COMMERCIAL

## 2022-07-05 DIAGNOSIS — M75.122: Primary | ICD-10-CM

## 2022-11-30 ENCOUNTER — HOSPITAL ENCOUNTER (OUTPATIENT)
Dept: HOSPITAL 6 - LAB | Age: 62
End: 2022-11-30
Attending: INTERNAL MEDICINE
Payer: COMMERCIAL

## 2022-11-30 DIAGNOSIS — K92.1: ICD-10-CM

## 2022-11-30 DIAGNOSIS — Z00.01: Primary | ICD-10-CM

## 2022-11-30 DIAGNOSIS — K90.9: ICD-10-CM

## 2022-11-30 DIAGNOSIS — Z12.11: ICD-10-CM

## 2022-11-30 DIAGNOSIS — Z12.5: ICD-10-CM

## 2022-11-30 LAB
ALBUMIN SERPL-MCNC: 4 G/DL (ref 3.5–5)
ALT SERPL-CCNC: 48 U/L (ref 0–55)
AST SERPL-CCNC: 29 U/L (ref 5–34)
BASOPHILS # BLD: 0.01 K/MM3 (ref 0.02–0.1)
BILIRUB SERPL-MCNC: 0.8 MG/DL (ref 0.2–1.2)
CALCIUM SERPL-MCNC: 9.2 MG/DL (ref 8.3–10.5)
CO2 SERPL-SCNC: 23 MMOL/L (ref 22–29)
EOSINOPHIL # BLD: 0.12 K/MM3 (ref 0.04–0.4)
EOSINOPHIL NFR BLD: 1.5 % (ref 0–4)
ERYTHROCYTE [DISTWIDTH] IN BLOOD BY AUTOMATED COUNT: 12.8 % (ref 11.5–14.5)
ERYTHROCYTE [SEDIMENTATION RATE] IN BLOOD: 14 MM/HR (ref 0–20)
GLUCOSE SERPL-MCNC: 110 MG/DL (ref 75–110)
HCT VFR BLD AUTO: 41.3 % (ref 42–52)
HGB BLD-MCNC: 13.9 G/DL (ref 13.5–18)
LYMPHOCYTES # BLD: 0.75 K/MM3 (ref 1.5–4)
MAGNESIUM SERPL-MCNC: 1.77 MG/DL (ref 1.6–2.6)
MCH RBC QN AUTO: 29 PG (ref 27–31)
MCHC RBC AUTO-ENTMCNC: 34 G/DL (ref 33–37)
MCV RBC AUTO: 87 FL (ref 78–100)
MONOCYTES # BLD: 0.59 K/MM3 (ref 0.2–0.8)
NEUTROPHILS # BLD: 6.32 K/MM3 (ref 1.4–6.5)
PLATELET # BLD AUTO: 180 K/MM3 (ref 130–400)
PMV BLD AUTO: 10.1 FL (ref 7.4–10.4)
POTASSIUM SERPL-SCNC: 3.8 MMOL/L (ref 3.5–5.1)
PROT SERPL-MCNC: 6.8 G/DL (ref 6.4–8.3)
RBC # BLD AUTO: 4.77 M/MM3 (ref 4.2–5.6)
SODIUM SERPL-SCNC: 137 MMOL/L (ref 136–145)
TESTOST SERPL-MCNC: 441 NG/DL (ref 221–716)
WBC # BLD AUTO: 7.8 K/MM3 (ref 4.8–10.8)

## 2022-12-05 ENCOUNTER — HOSPITAL ENCOUNTER (OUTPATIENT)
Dept: HOSPITAL 6 - LAB | Age: 62
End: 2022-12-05
Attending: INTERNAL MEDICINE
Payer: COMMERCIAL

## 2022-12-05 DIAGNOSIS — K90.9: ICD-10-CM

## 2022-12-05 DIAGNOSIS — Z12.11: ICD-10-CM

## 2022-12-05 DIAGNOSIS — Z00.00: Primary | ICD-10-CM

## 2022-12-05 DIAGNOSIS — Z12.5: ICD-10-CM

## 2023-08-01 ENCOUNTER — HOSPITAL ENCOUNTER (OUTPATIENT)
Dept: HOSPITAL 6 - PT | Age: 63
LOS: 30 days | Discharge: HOME | End: 2023-08-31
Attending: ORTHOPAEDIC SURGERY
Payer: COMMERCIAL

## 2023-08-01 DIAGNOSIS — M19.112: Primary | ICD-10-CM

## 2023-08-01 DIAGNOSIS — S46.002S: ICD-10-CM
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
## 2023-08-01 DIAGNOSIS — Z96.612: ICD-10-CM

## 2023-12-04 ENCOUNTER — HOSPITAL ENCOUNTER (OUTPATIENT)
Dept: HOSPITAL 6 - LAB | Age: 63
End: 2023-12-04
Attending: INTERNAL MEDICINE
Payer: COMMERCIAL

## 2023-12-04 DIAGNOSIS — E55.9: ICD-10-CM

## 2023-12-04 DIAGNOSIS — K90.9: ICD-10-CM

## 2023-12-04 DIAGNOSIS — E03.4: ICD-10-CM

## 2023-12-04 DIAGNOSIS — Z11.59: ICD-10-CM

## 2023-12-04 DIAGNOSIS — Z12.11: ICD-10-CM

## 2023-12-04 DIAGNOSIS — E53.8: ICD-10-CM

## 2023-12-04 DIAGNOSIS — K76.0: ICD-10-CM

## 2023-12-04 DIAGNOSIS — E78.2: ICD-10-CM

## 2023-12-04 DIAGNOSIS — Z12.5: ICD-10-CM

## 2023-12-04 DIAGNOSIS — I10: ICD-10-CM

## 2023-12-04 DIAGNOSIS — Z00.01: Primary | ICD-10-CM

## 2023-12-04 LAB
ALBUMIN SERPL-MCNC: 4 G/DL (ref 3.4–4.8)
ALT SERPL-CCNC: 58 U/L (ref 0–55)
AST SERPL-CCNC: 39 U/L (ref 5–34)
BASOPHILS # BLD: 0.03 K/MM3 (ref 0.02–0.1)
BILIRUB SERPL-MCNC: 0.6 MG/DL (ref 0.2–1.2)
CALCIUM SERPL-MCNC: 9.3 MG/DL (ref 8.3–10.5)
CO2 SERPL-SCNC: 26 MMOL/L (ref 23–31)
EOSINOPHIL # BLD: 0.12 K/MM3 (ref 0.04–0.4)
EOSINOPHIL NFR BLD: 3.1 % (ref 0–4)
ERYTHROCYTE [DISTWIDTH] IN BLOOD BY AUTOMATED COUNT: 13.4 % (ref 11.5–14.5)
GLUCOSE SERPL-MCNC: 93 MG/DL (ref 75–110)
HCT VFR BLD AUTO: 41.8 % (ref 42–52)
HGB BLD-MCNC: 13.9 G/DL (ref 13.5–18)
LYMPHOCYTES # BLD: 0.8 K/MM3 (ref 1.5–4)
MAGNESIUM SERPL-MCNC: 1.88 MG/DL (ref 1.6–2.6)
MCH RBC QN AUTO: 30 PG (ref 27–31)
MCHC RBC AUTO-ENTMCNC: 33 G/DL (ref 33–37)
MCV RBC AUTO: 89 FL (ref 78–100)
MONOCYTES # BLD: 0.37 K/MM3 (ref 0.2–0.8)
NEUTROPHILS # BLD: 2.6 K/MM3 (ref 1.4–6.5)
PLATELET # BLD AUTO: 199 K/MM3 (ref 130–400)
PMV BLD AUTO: 9.3 FL (ref 7.4–10.4)
PROT SERPL-MCNC: 6.9 G/DL (ref 6.2–8.1)
RBC # BLD AUTO: 4.71 M/MM3 (ref 4.2–5.6)
SODIUM SERPL-SCNC: 142 MMOL/L (ref 136–145)
WBC # BLD AUTO: 3.9 K/MM3 (ref 4.8–10.8)

## 2024-06-20 ENCOUNTER — HOSPITAL ENCOUNTER (OUTPATIENT)
Dept: HOSPITAL 6 - LAB | Age: 64
End: 2024-06-20
Attending: INTERNAL MEDICINE
Payer: COMMERCIAL

## 2024-06-20 DIAGNOSIS — K90.9: ICD-10-CM

## 2024-06-20 DIAGNOSIS — E78.2: ICD-10-CM

## 2024-06-20 DIAGNOSIS — E03.4: Primary | ICD-10-CM

## 2024-06-20 LAB
ALBUMIN SERPL-MCNC: 4 G/DL (ref 3.4–4.8)
ALT SERPL-CCNC: 34 U/L (ref 0–55)
AST SERPL-CCNC: 29 U/L (ref 5–34)
BASOPHILS # BLD: 0.02 K/MM3 (ref 0.02–0.1)
BILIRUB SERPL-MCNC: 0.7 MG/DL (ref 0.2–1.2)
CALCIUM SERPL-MCNC: 9.5 MG/DL (ref 8.3–10.5)
CO2 SERPL-SCNC: 23 MMOL/L (ref 23–31)
EOSINOPHIL # BLD: 0.18 K/MM3 (ref 0.04–0.4)
EOSINOPHIL NFR BLD: 5.1 % (ref 0–4)
ERYTHROCYTE [DISTWIDTH] IN BLOOD BY AUTOMATED COUNT: 13.9 % (ref 11.5–14.5)
GLUCOSE SERPL-MCNC: 89 MG/DL (ref 75–110)
HCT VFR BLD AUTO: 41.4 % (ref 42–52)
HGB BLD-MCNC: 13.3 G/DL (ref 13.5–18)
LYMPHOCYTES # BLD: 0.88 K/MM3 (ref 1.5–4)
MAGNESIUM SERPL-MCNC: 2.04 MG/DL (ref 1.6–2.6)
MCH RBC QN AUTO: 29 PG (ref 27–31)
MCHC RBC AUTO-ENTMCNC: 32 G/DL (ref 33–37)
MCV RBC AUTO: 90 FL (ref 78–100)
MONOCYTES # BLD: 0.48 K/MM3 (ref 0.2–0.8)
NEUTROPHILS # BLD: 1.97 K/MM3 (ref 1.4–6.5)
PLATELET # BLD AUTO: 243 K/MM3 (ref 130–400)
PMV BLD AUTO: 9.8 FL (ref 7.4–10.4)
PROT SERPL-MCNC: 6.5 G/DL (ref 6.2–8.1)
RBC # BLD AUTO: 4.59 M/MM3 (ref 4.2–5.6)
SODIUM SERPL-SCNC: 142 MMOL/L (ref 136–145)
WBC # BLD AUTO: 3.5 K/MM3 (ref 4.8–10.8)

## 2024-07-15 ENCOUNTER — HOSPITAL ENCOUNTER (OUTPATIENT)
Dept: HOSPITAL 6 - LAB | Age: 64
End: 2024-07-15
Attending: INTERNAL MEDICINE
Payer: COMMERCIAL

## 2024-07-15 DIAGNOSIS — M72.2: Primary | ICD-10-CM

## 2024-09-26 ENCOUNTER — HOSPITAL ENCOUNTER (OUTPATIENT)
Dept: HOSPITAL 6 - RAD | Age: 64
End: 2024-09-26
Attending: INTERNAL MEDICINE
Payer: COMMERCIAL

## 2024-09-26 DIAGNOSIS — M43.17: Primary | ICD-10-CM

## 2024-09-26 DIAGNOSIS — M41.86: ICD-10-CM

## 2024-09-26 DIAGNOSIS — M16.0: ICD-10-CM

## 2024-09-26 DIAGNOSIS — M47.816: ICD-10-CM

## 2024-10-10 ENCOUNTER — HOSPITAL ENCOUNTER (OUTPATIENT)
Dept: HOSPITAL 6 - RAD | Age: 64
End: 2024-10-10
Attending: ORTHOPAEDIC SURGERY
Payer: COMMERCIAL

## 2024-10-10 DIAGNOSIS — M43.16: ICD-10-CM

## 2024-10-10 DIAGNOSIS — M51.16: Primary | ICD-10-CM

## 2024-10-10 DIAGNOSIS — M24.28: ICD-10-CM

## 2024-10-10 DIAGNOSIS — M48.07: ICD-10-CM

## 2024-10-10 DIAGNOSIS — M51.17: ICD-10-CM

## 2024-10-10 DIAGNOSIS — M41.86: ICD-10-CM

## 2024-10-10 DIAGNOSIS — M47.26: ICD-10-CM

## 2024-10-10 DIAGNOSIS — M43.17: ICD-10-CM

## 2024-10-10 DIAGNOSIS — M48.061: ICD-10-CM

## 2024-12-10 ENCOUNTER — HOSPITAL ENCOUNTER (OUTPATIENT)
Dept: HOSPITAL 6 - LAB | Age: 64
End: 2024-12-10
Attending: INTERNAL MEDICINE
Payer: COMMERCIAL

## 2024-12-10 DIAGNOSIS — I10: ICD-10-CM

## 2024-12-10 DIAGNOSIS — E78.2: ICD-10-CM

## 2024-12-10 DIAGNOSIS — K90.9: ICD-10-CM

## 2024-12-10 DIAGNOSIS — Z12.11: ICD-10-CM

## 2024-12-10 DIAGNOSIS — Z12.5: Primary | ICD-10-CM

## 2024-12-10 DIAGNOSIS — R73.9: ICD-10-CM

## 2024-12-10 LAB
ALBUMIN SERPL-MCNC: 3.9 G/DL (ref 3.4–4.8)
ALT SERPL-CCNC: 42 U/L (ref 0–55)
AST SERPL-CCNC: 42 U/L (ref 5–34)
BASOPHILS # BLD: 0.02 K/MM3 (ref 0.02–0.1)
BILIRUB SERPL-MCNC: 0.4 MG/DL (ref 0.2–1.2)
CALCIUM SERPL-MCNC: 10.4 MG/DL (ref 8.3–10.5)
CO2 SERPL-SCNC: 26 MMOL/L (ref 23–31)
EOSINOPHIL # BLD: 0.2 K/MM3 (ref 0.04–0.4)
EOSINOPHIL NFR BLD: 4.4 % (ref 0–4)
ERYTHROCYTE [DISTWIDTH] IN BLOOD BY AUTOMATED COUNT: 13.7 % (ref 11.5–14.5)
GLUCOSE SERPL-MCNC: 95 MG/DL (ref 75–110)
HCT VFR BLD AUTO: 38.2 % (ref 42–52)
HGB BLD-MCNC: 12.5 G/DL (ref 13.5–18)
LYMPHOCYTES # BLD: 0.87 K/MM3 (ref 1.5–4)
MAGNESIUM SERPL-MCNC: 2.04 MG/DL (ref 1.6–2.6)
MCH RBC QN AUTO: 29 PG (ref 27–31)
MCHC RBC AUTO-ENTMCNC: 33 G/DL (ref 33–37)
MCV RBC AUTO: 89 FL (ref 78–100)
MONOCYTES # BLD: 0.46 K/MM3 (ref 0.2–0.8)
NEUTROPHILS # BLD: 2.95 K/MM3 (ref 1.4–6.5)
PLATELET # BLD AUTO: 206 K/MM3 (ref 130–400)
PMV BLD AUTO: 9.5 FL (ref 7.4–10.4)
PROT SERPL-MCNC: 6.3 G/DL (ref 6.2–8.1)
RBC # BLD AUTO: 4.31 M/MM3 (ref 4.2–5.6)
SODIUM SERPL-SCNC: 142 MMOL/L (ref 136–145)
WBC # BLD AUTO: 4.5 K/MM3 (ref 4.8–10.8)

## 2024-12-17 ENCOUNTER — HOSPITAL ENCOUNTER (OUTPATIENT)
Dept: HOSPITAL 6 - LAB | Age: 64
End: 2024-12-17
Attending: INTERNAL MEDICINE
Payer: COMMERCIAL

## 2024-12-17 DIAGNOSIS — K90.9: ICD-10-CM

## 2024-12-17 DIAGNOSIS — Z12.11: ICD-10-CM

## 2024-12-17 DIAGNOSIS — I10: ICD-10-CM

## 2024-12-17 DIAGNOSIS — E78.2: ICD-10-CM

## 2024-12-17 DIAGNOSIS — Z12.5: Primary | ICD-10-CM

## 2024-12-17 DIAGNOSIS — R73.9: ICD-10-CM

## 2025-02-10 ENCOUNTER — HOSPITAL ENCOUNTER (OUTPATIENT)
Dept: HOSPITAL 6 - LAB | Age: 65
End: 2025-02-10
Attending: NURSE PRACTITIONER
Payer: COMMERCIAL

## 2025-02-10 DIAGNOSIS — R50.9: Primary | ICD-10-CM

## 2025-02-10 LAB
ERYTHROCYTE [DISTWIDTH] IN BLOOD BY AUTOMATED COUNT: 13.9 % (ref 11.5–14.5)
HCT VFR BLD AUTO: 42.7 % (ref 42–52)
HGB BLD-MCNC: 13.9 G/DL (ref 13.5–18)
LYMPHOCYTES NFR BLD MANUAL: 8 % (ref 20–51)
MCH RBC QN AUTO: 28 PG (ref 27–31)
MCHC RBC AUTO-ENTMCNC: 33 G/DL (ref 33–37)
MCV RBC AUTO: 87 FL (ref 78–100)
MONOCYTES NFR BLD: 1 % (ref 3–10)
NEUTS BAND NFR BLD: 9 % (ref 0–10)
NEUTS SEG NFR BLD MANUAL: 82 % (ref 42–75)
PLATELET # BLD AUTO: 207 K/MM3 (ref 130–400)
PMV BLD AUTO: 9.4 FL (ref 7.4–10.4)
RBC # BLD AUTO: 4.89 M/MM3 (ref 4.2–5.6)
WBC # BLD AUTO: 5.6 K/MM3 (ref 4.8–10.8)